# Patient Record
Sex: FEMALE | Race: BLACK OR AFRICAN AMERICAN | NOT HISPANIC OR LATINO | Employment: FULL TIME | URBAN - METROPOLITAN AREA
[De-identification: names, ages, dates, MRNs, and addresses within clinical notes are randomized per-mention and may not be internally consistent; named-entity substitution may affect disease eponyms.]

---

## 2018-11-27 ENCOUNTER — OFFICE VISIT (OUTPATIENT)
Dept: URGENT CARE | Facility: CLINIC | Age: 28
End: 2018-11-27
Payer: COMMERCIAL

## 2018-11-27 VITALS
HEIGHT: 59 IN | OXYGEN SATURATION: 99 % | RESPIRATION RATE: 18 BRPM | WEIGHT: 105 LBS | HEART RATE: 70 BPM | BODY MASS INDEX: 21.17 KG/M2 | SYSTOLIC BLOOD PRESSURE: 116 MMHG | TEMPERATURE: 99 F | DIASTOLIC BLOOD PRESSURE: 75 MMHG

## 2018-11-27 DIAGNOSIS — R11.2 NAUSEA AND VOMITING, INTRACTABILITY OF VOMITING NOT SPECIFIED, UNSPECIFIED VOMITING TYPE: ICD-10-CM

## 2018-11-27 DIAGNOSIS — B34.9 ACUTE VIRAL SYNDROME: Primary | ICD-10-CM

## 2018-11-27 DIAGNOSIS — R35.0 URINARY FREQUENCY: ICD-10-CM

## 2018-11-27 DIAGNOSIS — J00 NASOPHARYNGITIS: ICD-10-CM

## 2018-11-27 DIAGNOSIS — R05.9 COUGH: ICD-10-CM

## 2018-11-27 LAB
B-HCG UR QL: NEGATIVE
BILIRUB UR QL STRIP: NEGATIVE
CTP QC/QA: YES
GLUCOSE UR QL STRIP: NEGATIVE
KETONES UR QL STRIP: NEGATIVE
LEUKOCYTE ESTERASE UR QL STRIP: NEGATIVE
PH, POC UA: 5.5
POC BLOOD, URINE: POSITIVE
POC NITRATES, URINE: NEGATIVE
PROT UR QL STRIP: POSITIVE
SP GR UR STRIP: 1.02 (ref 1–1.03)
UROBILINOGEN UR STRIP-ACNC: NORMAL (ref 0.1–1.1)

## 2018-11-27 PROCEDURE — 99203 OFFICE O/P NEW LOW 30 MIN: CPT | Mod: 25,S$GLB,, | Performed by: NURSE PRACTITIONER

## 2018-11-27 PROCEDURE — 81025 URINE PREGNANCY TEST: CPT | Mod: S$GLB,,, | Performed by: NURSE PRACTITIONER

## 2018-11-27 PROCEDURE — 81003 URINALYSIS AUTO W/O SCOPE: CPT | Mod: QW,S$GLB,, | Performed by: NURSE PRACTITIONER

## 2018-11-27 RX ORDER — PROMETHAZINE HYDROCHLORIDE AND DEXTROMETHORPHAN HYDROBROMIDE 6.25; 15 MG/5ML; MG/5ML
5 SYRUP ORAL NIGHTLY PRN
Qty: 120 ML | Refills: 0 | Status: SHIPPED | OUTPATIENT
Start: 2018-11-27 | End: 2018-12-07

## 2018-11-27 RX ORDER — ONDANSETRON 4 MG/1
4 TABLET, FILM COATED ORAL EVERY 8 HOURS PRN
Qty: 10 TABLET | Refills: 0 | Status: SHIPPED | OUTPATIENT
Start: 2018-11-27 | End: 2019-05-14

## 2018-11-27 NOTE — PATIENT INSTRUCTIONS
"Maintain hydration by drinking small amounts of clear fluids frequently, then soft diet, and then advance diet as tolerated. May use OTC Imodium or Pepto if desired for any diarrhea.    If your condition worsens or fails to improve we recommend that you receive another evaluation at the ER immediately or contact your PCP to discuss your concerns or return here. You must understand that you've received an urgent care treatment only and that you may be released before all your medical problems are known or treated. You the patient will arrange for follouwp care as instructed.   If we discussed that I think your illness is viral, it will not respond to antibiotics and will last 10-14 days.   Flonase (fluticasone) is a nasal spray which is available over the counter and may help with your symptoms.   Zyrtec D, Claritin D or Allegra D can also help with symptoms of congestion and drainage.   If you have hypertension avoid using the "D" which is the decongestant   If you just have drainage you can take plain zyrtec, claritin or allegra   Nasal irrigation with a saline spray or Netti Pot like device per their directions is also recommended.  If you just have a congested feeling you can take pseudoephedrine (unless you have high blood pressure) which you have to sign for behind the counter. Do not buy the phenylephrine which is on the shelf as it is not effective   Rest and fluids are also important.   Tylenol or ibuprofen can also be used as directed for pain unless you have an allergy to them or medical condition such as stomach ulcers, kidney or liver disease or blood thinners etc for which you should not be taking these type of medications.         Viral Syndrome (Adult)  A viral illness may cause a number of symptoms. The symptoms depend on the part of the body that the virus affects. If it settles in your nose, throat, and lungs, it may cause cough, sore throat, congestion, and sometimes headache. If it settles in " "your stomach and intestinal tract, it may cause vomiting and diarrhea. Sometimes it causes vague symptoms like "aching all over," feeling tired, loss of appetite, or fever.  A viral illness usually lasts 1 to 2 weeks, but sometimes it lasts longer. In some cases, a more serious infection can look like a viral syndrome in the first few days of the illness. You may need another exam and additional tests to know the difference. Watch for the warning signs listed below.  Home care  Follow these guidelines for taking care of yourself at home:  · If symptoms are severe, rest at home for the first 2 to 3 days.  · Stay away from cigarette smoke - both your smoke and the smoke from others.  · You may use over-the-counter acetaminophen or ibuprofen for fever, muscle aching, and headache, unless another medicine was prescribed for this. If you have chronic liver or kidney disease or ever had a stomach ulcer or GI bleeding, talk with your doctor before using these medicines. No one who is younger than 18 and ill with a fever should take aspirin. It may cause severe disease or death.  · Your appetite may be poor, so a light diet is fine. Avoid dehydration by drinking 8 to 12 8-ounce glasses of fluids each day. This may include water; orange juice; lemonade; apple, grape, and cranberry juice; clear fruit drinks; electrolyte replacement and sports drinks; and decaffeinated teas and coffee. If you have been diagnosed with a kidney disease, ask your doctor how much and what types of fluids you should drink to prevent dehydration. If you have kidney disease, drinking too much fluid can cause it build up in the your body and be dangerous to your health.  · Over-the-counter remedies won't shorten the length of the illness but may be helpful for cough, sore throat; and nasal and sinus congestion. Don't use decongestants if you have high blood pressure.  Follow-up care  Follow up with your healthcare provider if you do not improve over " the next week.  Call 911  Get emergency medical care if any of the following occur:  · Convulsion  · Feeling weak, dizzy, or like you are going to faint  · Chest pain, shortness of breath, wheezing, or difficulty breathing  When to seek medical advice  Call your healthcare provider right away if any of these occur:  · Cough with lots of colored sputum (mucus) or blood in your sputum  · Chest pain, shortness of breath, wheezing, or difficulty breathing  · Severe headache; face, neck, or ear pain  · Severe, constant pain in the lower right side of your belly (abdominal)  · Continued vomiting (cant keep liquids down)  · Frequent diarrhea (more than 5 times a day); blood (red or black color) or mucus in diarrhea  · Feeling weak, dizzy, or like you are going to faint  · Extreme thirst  · Fever of 100.4°F (38°C) or higher, or as directed by your healthcare provider  Date Last Reviewed: 9/25/2015  © 0137-9651 [employer]. [address] [place] [address], [place] [address]. All rights reserved. This information is not intended as a substitute for professional medical care. Always follow your healthcare professional's instructions.

## 2018-11-27 NOTE — LETTER
November 27, 2018      [facility] [address] [place]  [facility] [address]  Phone: [phone]  Fax: [phone]       Patient: Nancie Ash   YOB: 1990  Date of Visit: 11/27/2018    To Whom It May Concern:    Marisela Ash  was at [facility] on 11/27/2018. She may return to work/school on 11/29/18 with no restrictions if fever free for 24 hours. If you have any questions or concerns, or if I can be of further assistance, please do not hesitate to contact me.    Sincerely,        Bella Simpson, NP

## 2018-11-27 NOTE — PROGRESS NOTES
"Subjective:       Patient ID: Nancie Ash is a 28 y.o. female.    Vitals:    11/27/18 1141   BP: 116/75   Pulse: 70   Resp: 18   Temp: 98.8 °F (37.1 °C)   SpO2: 99%   Weight: 47.6 kg (105 lb)   Height: 4' 11" (1.499 m)       Chief Complaint: Emesis (started on sunday ); Urinary Frequency; Sore Throat; and Back Pain (upper back' RT SIDE )    Patient presents in clinic with complaint of nausea and vomiting that started on Sunday with sore throat nasal congestion and cough.  She does report body aches especially in her back with some urinary frequency.  She denies dysuria or vaginal discharge. She denies urgency to urinate.  No flank pain. Denies hematuria but she is current on her menstrual cycle.  She has not had any episodes of emesis today.  Denies abdominal pain. She reports a fever of 102 F, last fever was yesterday.  Last emesis was yesterday.  Still with some nausea today and decreased appetite today.  When asked which symptom is bothering her the worst, she says her throat.  Patient states that her daughter had the same symptoms last week.  Her daughter was tested for the flu and strep all were negative and was told viral.  She is using [place] cold over-the-counter with no relief of symptoms.      Cough   This is a new problem. The problem has been gradually worsening. The cough is productive of sputum. Associated symptoms include a fever, headaches, nasal congestion, postnasal drip and a sore throat. Pertinent negatives include no chest pain, chills, ear congestion, ear pain, eye redness, heartburn, hemoptysis, myalgias, rash, rhinorrhea, shortness of breath, sweats, weight loss or wheezing. Nothing aggravates the symptoms. She has tried OTC cough suppressant for the symptoms. The treatment provided no relief. There is no history of asthma, bronchitis or pneumonia.     Review of Systems   Constitution: Positive for decreased appetite and fever. Negative for chills, malaise/fatigue and weight " loss.   HENT: Positive for hoarse voice, postnasal drip and sore throat. Negative for ear pain, odynophagia, rhinorrhea and stridor.    Eyes: Negative for discharge and redness.   Cardiovascular: Negative for chest pain, dyspnea on exertion and leg swelling.   Respiratory: Positive for cough and sputum production (green/yellow ). Negative for hemoptysis, shortness of breath and wheezing.    Skin: Negative for rash.   Musculoskeletal: Positive for back pain (upper). Negative for myalgias.   Gastrointestinal: Positive for nausea and vomiting. Negative for abdominal pain and heartburn.   Genitourinary: Positive for frequency. Negative for dysuria, flank pain, hematuria, pelvic pain and urgency.   Neurological: Positive for headaches.       Objective:      Physical Exam   Constitutional: She is oriented to person, place, and time. She appears well-developed and well-nourished. She is cooperative.  Non-toxic appearance. She does not have a sickly appearance. She does not appear ill. No distress.   HENT:   Head: Normocephalic and atraumatic.   Right Ear: Hearing, tympanic membrane, external ear and ear canal normal.   Left Ear: Hearing, tympanic membrane, external ear and ear canal normal.   Nose: Mucosal edema and rhinorrhea present. No nasal deformity. No epistaxis. Right sinus exhibits no maxillary sinus tenderness and no frontal sinus tenderness. Left sinus exhibits no maxillary sinus tenderness and no frontal sinus tenderness.   Mouth/Throat: Uvula is midline and mucous membranes are normal. No trismus in the jaw. Normal dentition. No uvula swelling. Posterior oropharyngeal erythema (mild, hoarseness) present. No oropharyngeal exudate, posterior oropharyngeal edema or tonsillar abscesses.   Eyes: Conjunctivae and lids are normal. No scleral icterus.   Sclera clear bilat   Neck: Trachea normal, full passive range of motion without pain and phonation normal. Neck supple.   Cardiovascular: Normal rate, regular rhythm,  normal heart sounds, intact distal pulses and normal pulses.   Pulmonary/Chest: Effort normal and breath sounds normal. No respiratory distress.   Abdominal: Soft. Normal appearance and bowel sounds are normal. She exhibits no distension. There is no tenderness. There is no rigidity, no rebound, no guarding and no CVA tenderness.   Musculoskeletal: Normal range of motion. She exhibits no edema or deformity.   Lymphadenopathy:     She has no cervical adenopathy.   Neurological: She is alert and oriented to person, place, and time. She exhibits normal muscle tone. Coordination normal.   Skin: Skin is warm, dry and intact. She is not diaphoretic. No pallor.   Psychiatric: She has a normal mood and affect. Her speech is normal and behavior is normal. Judgment and thought content normal. Cognition and memory are normal.   Nursing note and vitals reviewed.      Results for orders placed or performed in visit on 11/27/18   POCT urine pregnancy   Result Value Ref Range    POC Preg Test, Ur Negative Negative     Acceptable Yes    POCT Urinalysis, Dipstick, Automated, W/O Scope   Result Value Ref Range    POC Blood, Urine Positive (A) Negative    POC Bilirubin, Urine Negative Negative    POC Urobilinogen, Urine normal 0.1 - 1.1    POC Ketones, Urine Negative Negative    POC Protein, Urine Positive (A) Negative    POC Nitrates, Urine Negative Negative    POC Glucose, Urine Negative Negative    pH, UA 5.5     POC Specific Gravity, Urine 1.025 1.003 - 1.029    POC Leukocytes, Urine Negative Negative     Assessment:       1. Acute viral syndrome    2. Urinary frequency    3. Nausea and vomiting, intractability of vomiting not specified, unspecified vomiting type    4. Nasopharyngitis    5. Cough        Plan:       Nancie was seen today for emesis, urinary frequency, sore throat and back pain.    Diagnoses and all orders for this visit:    Acute viral syndrome    Urinary frequency  -     POCT urine pregnancy  -      "POCT Urinalysis, Dipstick, Automated, W/O Scope    Nausea and vomiting, intractability of vomiting not specified, unspecified vomiting type  -     ondansetron (ZOFRAN) 4 MG tablet; Take 1 tablet (4 mg total) by mouth every 8 (eight) hours as needed for Nausea.    Nasopharyngitis    Cough  -     promethazine-dextromethorphan (PROMETHAZINE-DM) 6.25-15 mg/5 mL Syrp; Take 5 mLs by mouth nightly as needed (cough).      Patient Instructions   Maintain hydration by drinking small amounts of clear fluids frequently, then soft diet, and then advance diet as tolerated. May use OTC Imodium or Pepto if desired for any diarrhea.    If your condition worsens or fails to improve we recommend that you receive another evaluation at the ER immediately or contact your PCP to discuss your concerns or return here. You must understand that you've received an urgent care treatment only and that you may be released before all your medical problems are known or treated. You the patient will arrange for [facility] care as instructed.   If we discussed that I think your illness is viral, it will not respond to antibiotics and will last 10-14 days.   Flonase (fluticasone) is a nasal spray which is available over the counter and may help with your symptoms.   Zyrtec D, Claritin D or Allegra D can also help with symptoms of congestion and drainage.   If you have hypertension avoid using the "D" which is the decongestant   If you just have drainage you can take plain zyrtec, claritin or allegra   Nasal irrigation with a saline spray or Netti Pot like device per their directions is also recommended.  If you just have a congested feeling you can take pseudoephedrine (unless you have high blood pressure) which you have to sign for behind the counter. Do not buy the phenylephrine which is on the shelf as it is not effective   Rest and fluids are also important.   Tylenol or ibuprofen can also be used as directed for pain unless you have an allergy to " "them or medical condition such as stomach ulcers, kidney or liver disease or blood thinners etc for which you should not be taking these type of medications.         Viral Syndrome (Adult)  A viral illness may cause a number of symptoms. The symptoms depend on the part of the body that the virus affects. If it settles in your nose, throat, and lungs, it may cause cough, sore throat, congestion, and sometimes headache. If it settles in your stomach and intestinal tract, it may cause vomiting and diarrhea. Sometimes it causes vague symptoms like "aching all over," feeling tired, loss of appetite, or fever.  A viral illness usually lasts 1 to 2 weeks, but sometimes it lasts longer. In some cases, a more serious infection can look like a viral syndrome in the first few days of the illness. You may need another exam and additional tests to know the difference. Watch for the warning signs listed below.  Home care  Follow these guidelines for taking care of yourself at home:  · If symptoms are severe, rest at home for the first 2 to 3 days.  · Stay away from cigarette smoke - both your smoke and the smoke from others.  · You may use over-the-counter acetaminophen or ibuprofen for fever, muscle aching, and headache, unless another medicine was prescribed for this. If you have chronic liver or kidney disease or ever had a stomach ulcer or GI bleeding, talk with your doctor before using these medicines. No one who is younger than 18 and ill with a fever should take aspirin. It may cause severe disease or death.  · Your appetite may be poor, so a light diet is fine. Avoid dehydration by drinking 8 to 12 8-ounce glasses of fluids each day. This may include water; orange juice; lemonade; apple, grape, and cranberry juice; clear fruit drinks; electrolyte replacement and sports drinks; and decaffeinated teas and coffee. If you have been diagnosed with a kidney disease, ask your doctor how much and what types of fluids you should " drink to prevent dehydration. If you have kidney disease, drinking too much fluid can cause it build up in the your body and be dangerous to your health.  · Over-the-counter remedies won't shorten the length of the illness but may be helpful for cough, sore throat; and nasal and sinus congestion. Don't use decongestants if you have high blood pressure.  Follow-up care  Follow up with your healthcare provider if you do not improve over the next week.  Call 911  Get emergency medical care if any of the following occur:  · Convulsion  · Feeling weak, dizzy, or like you are going to faint  · Chest pain, shortness of breath, wheezing, or difficulty breathing  When to seek medical advice  Call your healthcare provider right away if any of these occur:  · Cough with lots of colored sputum (mucus) or blood in your sputum  · Chest pain, shortness of breath, wheezing, or difficulty breathing  · Severe headache; face, neck, or ear pain  · Severe, constant pain in the lower right side of your belly (abdominal)  · Continued vomiting (cant keep liquids down)  · Frequent diarrhea (more than 5 times a day); blood (red or black color) or mucus in diarrhea  · Feeling weak, dizzy, or like you are going to faint  · Extreme thirst  · Fever of 100.4°F (38°C) or higher, or as directed by your healthcare provider  Date Last Reviewed: 9/25/2015  © 3718-2430 [employer]. [address] [place] [address]. All rights reserved. This information is not intended as a substitute for professional medical care. Always follow your healthcare professional's instructions.

## 2019-05-06 ENCOUNTER — OFFICE VISIT (OUTPATIENT)
Dept: URGENT CARE | Facility: CLINIC | Age: 29
End: 2019-05-06
Payer: COMMERCIAL

## 2019-05-06 VITALS
HEIGHT: 59 IN | TEMPERATURE: 99 F | SYSTOLIC BLOOD PRESSURE: 108 MMHG | RESPIRATION RATE: 16 BRPM | DIASTOLIC BLOOD PRESSURE: 70 MMHG | BODY MASS INDEX: 22.18 KG/M2 | HEART RATE: 93 BPM | WEIGHT: 110 LBS | OXYGEN SATURATION: 100 %

## 2019-05-06 DIAGNOSIS — J02.9 SORE THROAT: Primary | ICD-10-CM

## 2019-05-06 LAB
CTP QC/QA: YES
S PYO RRNA THROAT QL PROBE: NEGATIVE

## 2019-05-06 PROCEDURE — 99214 OFFICE O/P EST MOD 30 MIN: CPT | Mod: 25,S$GLB,, | Performed by: EMERGENCY MEDICINE

## 2019-05-06 PROCEDURE — 96372 PR INJECTION,THERAP/PROPH/DIAG2ST, IM OR SUBCUT: ICD-10-PCS | Mod: S$GLB,,, | Performed by: EMERGENCY MEDICINE

## 2019-05-06 PROCEDURE — 87880 STREP A ASSAY W/OPTIC: CPT | Mod: QW,S$GLB,, | Performed by: EMERGENCY MEDICINE

## 2019-05-06 PROCEDURE — 87880 POCT RAPID STREP A: ICD-10-PCS | Mod: QW,S$GLB,, | Performed by: EMERGENCY MEDICINE

## 2019-05-06 PROCEDURE — 99214 PR OFFICE/OUTPT VISIT, EST, LEVL IV, 30-39 MIN: ICD-10-PCS | Mod: 25,S$GLB,, | Performed by: EMERGENCY MEDICINE

## 2019-05-06 PROCEDURE — 3008F BODY MASS INDEX DOCD: CPT | Mod: CPTII,S$GLB,, | Performed by: EMERGENCY MEDICINE

## 2019-05-06 PROCEDURE — 96372 THER/PROPH/DIAG INJ SC/IM: CPT | Mod: S$GLB,,, | Performed by: EMERGENCY MEDICINE

## 2019-05-06 PROCEDURE — 3008F PR BODY MASS INDEX (BMI) DOCUMENTED: ICD-10-PCS | Mod: CPTII,S$GLB,, | Performed by: EMERGENCY MEDICINE

## 2019-05-06 RX ORDER — BETAMETHASONE SODIUM PHOSPHATE AND BETAMETHASONE ACETATE 3; 3 MG/ML; MG/ML
9 INJECTION, SUSPENSION INTRA-ARTICULAR; INTRALESIONAL; INTRAMUSCULAR; SOFT TISSUE
Status: COMPLETED | OUTPATIENT
Start: 2019-05-06 | End: 2019-05-06

## 2019-05-06 RX ADMIN — BETAMETHASONE SODIUM PHOSPHATE AND BETAMETHASONE ACETATE 9 MG: 3; 3 INJECTION, SUSPENSION INTRA-ARTICULAR; INTRALESIONAL; INTRAMUSCULAR; SOFT TISSUE at 04:05

## 2019-05-06 NOTE — PATIENT INSTRUCTIONS
Please return here or go to the Emergency Department for any concerns or worsening of condition.    Zyrtec, Claritin, or Allegra OTC as directed for the next 7 days    Flonase OTC as directed for the next 7 days    [place] Nasal Spray OTC 3x/day for the next 7 days    Tylenol 500mg 2 tabs by mouth every 8 hours  Motrin 200mg 2 tabs by mouth every 8 hours  Alternate Tylenol and Motrin every 4hours    Mucinex or Robitussin OTC as directed for cough    Sudafed as directed for runny nose and congestion      Follow up with Primary Care or ENT if not improved in 7-10 Days:  [phone]      Viral Pharyngitis (Sore Throat)     You (or your child, if your child is the patient) have pharyngitis (sore throat). This infection is caused by a virus. It can cause throat pain that is worse when swallowing, aching all over, headache, and fever. The infection may be spread by coughing, kissing, or touching others after touching your mouth or nose. Antibiotic medications do not work against viruses, so they are not used for treating this condition.  Home care  · If your symptoms are severe, rest at home. Return to work or school when you feel well enough.   · Drink plenty of fluids to avoid dehydration.  · For children: Use acetaminophen for fever, fussiness or discomfort. In infants over six months of age, you may use ibuprofen instead of acetaminophen. (NOTE: If your child has chronic liver or kidney disease or ever had a stomach ulcer or GI bleeding, talk with your doctor before using these medicines.) (NOTE: Aspirin should never be used in anyone under 18 years of age who is ill with a fever. It may cause severe liver damage.)   · For adults: You may use acetaminophen or ibuprofen to control pain or fever, unless another medicine was prescribed for this. (NOTE: If you have chronic liver or kidney disease or ever had a stomach ulcer or GI bleeding, talk with your doctor before using these medicines.)  · Throat lozenges or numbing  throat sprays can help reduce pain. Gargling with warm salt water will also help reduce throat pain. For this, dissolve 1/2 teaspoon of salt in 1 glass of warm water. To help soothe a sore throat, children can sip on juice or a popsicle. Children 5 years and older can also suck on a lollipop or hard candy.  · Avoid salty or spicy foods, which can be irritating to the throat.  Follow-up care  Follow up with your healthcare provider or our staff if you are not improving over the next week.  When to seek medical advice  Call your healthcare provider right away if any of these occur:  · Fever as directed by your doctor.  For children, seek care if:  ? Your child is of any age and has repeated fevers above 104°F (40°C).  ? Your child is younger than 2 years of age and has a fever of 100.4°F (38°C) that continues for more than 1 day.  ? Your child is 2 years old or older and has a fever of 100.4°F (38°C) that continues for more than 3 days.  · New or worsening ear pain, sinus pain, or headache  · Painful lumps in the back of neck  · Stiff neck  · Lymph nodes are getting larger  · Inability to swallow liquids, excessive drooling, or inability to open mouth wide due to throat pain  · Signs of dehydration (very dark urine or no urine, sunken eyes, dizziness)  · Trouble breathing or noisy breathing  · Muffled voice  · New rash  · Child appears to be getting sicker  Date Last Reviewed: 4/13/2015  © 1683-1812 [employer]. [address] [place] [address], [place] [address]. All rights reserved. This information is not intended as a substitute for professional medical care. Always follow your healthcare professional's instructions.

## 2019-05-06 NOTE — PROGRESS NOTES
"Subjective:       Patient ID: Nancie Ash is a 28 y.o. female.    Vitals:    05/06/19 1602   BP: 108/70   Pulse: 93   Resp: 16   Temp: 98.8 °F (37.1 °C)   TempSrc: Oral   SpO2: 100%   Weight: 49.9 kg (110 lb)   Height: 4' 11" (1.499 m)       Chief Complaint: Sore Throat    Patient reports sore throat and hoariness since yesterday.     Sore Throat    This is a new problem. The current episode started yesterday. The problem has been gradually worsening. Neither side of throat is experiencing more pain than the other. There has been no fever. The pain is at a severity of 10/10. Associated symptoms include coughing, headaches, a hoarse voice, a plugged ear sensation and trouble swallowing. Pertinent negatives include no abdominal pain, congestion, ear pain or shortness of breath. She has had no exposure to strep or mono. Treatments tried: Thera Flu. The treatment provided no relief.     Review of Systems   Constitution: Negative for chills, fever and malaise/fatigue.   HENT: Positive for hoarse voice, sore throat and trouble swallowing. Negative for congestion and ear pain.    Eyes: Negative for discharge and redness.   Cardiovascular: Negative for chest pain, dyspnea on exertion and leg swelling.   Respiratory: Positive for cough and sputum production (yellow/green). Negative for shortness of breath and wheezing.    Musculoskeletal: Negative for myalgias.   Gastrointestinal: Negative for abdominal pain and nausea.   Neurological: Positive for headaches.       Objective:      Physical Exam   Constitutional: She is oriented to person, place, and time. She appears well-developed and well-nourished. She is cooperative.  Non-toxic appearance. She does not appear ill. No distress.   HENT:   Head: Normocephalic and atraumatic.   Right Ear: Hearing, tympanic membrane, external ear and ear canal normal.   Left Ear: Hearing, tympanic membrane, external ear and ear canal normal.   Nose: Rhinorrhea present. No mucosal " edema or nasal deformity. No epistaxis. Right sinus exhibits no maxillary sinus tenderness and no frontal sinus tenderness. Left sinus exhibits no maxillary sinus tenderness and no frontal sinus tenderness.   Mouth/Throat: Uvula is midline and mucous membranes are normal. No trismus in the jaw. Normal dentition. No uvula swelling. Posterior oropharyngeal erythema present.   Eyes: Conjunctivae and lids are normal. No scleral icterus.   Sclera clear bilat   Neck: Trachea normal, full passive range of motion without pain and phonation normal. Neck supple.   Cardiovascular: Normal rate, regular rhythm, normal heart sounds, intact distal pulses and normal pulses.   Pulmonary/Chest: Effort normal and breath sounds normal. No respiratory distress.   Occasional cough on exam   Abdominal: Soft. Normal appearance and bowel sounds are normal. She exhibits no distension. There is no tenderness.   Musculoskeletal: Normal range of motion. She exhibits no edema or deformity.   Neurological: She is alert and oriented to person, place, and time. She exhibits normal muscle tone. Coordination normal.   Skin: Skin is warm, dry and intact. She is not diaphoretic. No pallor.   Psychiatric: She has a normal mood and affect. Her speech is normal and behavior is normal. Judgment and thought content normal. Cognition and memory are normal.   Nursing note and vitals reviewed.      Assessment:       1. Sore throat        Plan:       Nancie was seen today for sore throat.    Diagnoses and all orders for this visit:    Sore throat  -     POCT rapid strep A    Other orders  -     betamethasone acetate-betamethasone sodium phosphate injection 9 mg          Patient Instructions   Please return here or go to the Emergency Department for any concerns or worsening of condition.    Zyrtec, Claritin, or Allegra OTC as directed for the next 7 days    Flonase OTC as directed for the next 7 days    Salt Water Nasal Spray OTC 3x/day for the next 7  days    Tylenol 500mg 2 tabs by mouth every 8 hours  Motrin 200mg 2 tabs by mouth every 8 hours  Alternate Tylenol and Motrin every 4hours    Mucinex or Robitussin OTC as directed for cough    Sudafed as directed for runny nose and congestion      Follow up with Primary Care or ENT if not improved in 7-10 Days:  [phone]      Viral Pharyngitis (Sore Throat)     You (or your child, if your child is the patient) have pharyngitis (sore throat). This infection is caused by a virus. It can cause throat pain that is worse when swallowing, aching all over, headache, and fever. The infection may be spread by coughing, kissing, or touching others after touching your mouth or nose. Antibiotic medications do not work against viruses, so they are not used for treating this condition.  Home care  · If your symptoms are severe, rest at home. Return to work or school when you feel well enough.   · Drink plenty of fluids to avoid dehydration.  · For children: Use acetaminophen for fever, fussiness or discomfort. In infants over six months of age, you may use ibuprofen instead of acetaminophen. (NOTE: If your child has chronic liver or kidney disease or ever had a stomach ulcer or GI bleeding, talk with your doctor before using these medicines.) (NOTE: Aspirin should never be used in anyone under 18 years of age who is ill with a fever. It may cause severe liver damage.)   · For adults: You may use acetaminophen or ibuprofen to control pain or fever, unless another medicine was prescribed for this. (NOTE: If you have chronic liver or kidney disease or ever had a stomach ulcer or GI bleeding, talk with your doctor before using these medicines.)  · Throat lozenges or numbing throat sprays can help reduce pain. Gargling with warm salt water will also help reduce throat pain. For this, dissolve 1/2 teaspoon of salt in 1 glass of warm water. To help soothe a sore throat, children can sip on juice or a popsicle. Children 5 years and older  can also suck on a lollipop or hard candy.  · Avoid salty or spicy foods, which can be irritating to the throat.  Follow-up care  Follow up with your healthcare provider or our staff if you are not improving over the next week.  When to seek medical advice  Call your healthcare provider right away if any of these occur:  · Fever as directed by your doctor.  For children, seek care if:  ? Your child is of any age and has repeated fevers above 104°F (40°C).  ? Your child is younger than 2 years of age and has a fever of 100.4°F (38°C) that continues for more than 1 day.  ? Your child is 2 years old or older and has a fever of 100.4°F (38°C) that continues for more than 3 days.  · New or worsening ear pain, sinus pain, or headache  · Painful lumps in the back of neck  · Stiff neck  · Lymph nodes are getting larger  · Inability to swallow liquids, excessive drooling, or inability to open mouth wide due to throat pain  · Signs of dehydration (very dark urine or no urine, sunken eyes, dizziness)  · Trouble breathing or noisy breathing  · Muffled voice  · New rash  · Child appears to be getting sicker  Date Last Reviewed: 4/13/2015  © 7895-0441 [employer]. [address] [place] [address], [place] [address]. All rights reserved. This information is not intended as a substitute for professional medical care. Always follow your healthcare professional's instructions.

## 2019-05-06 NOTE — LETTER
May 6, 2019      [facility] [address]  [facility] [address]  Phone: [phone]  Fax: [phone]       Patient: Nancie Ash   YOB: 1990  Date of Visit: 05/06/2019    To Whom It May Concern:    Marisela Ash  was at [facility] on 05/06/2019. She may return to work/school on 5/8/19 with no restrictions. If you have any questions or concerns, or if I can be of further assistance, please do not hesitate to contact me.    Sincerely,    Oseas Doyle III, MD

## 2019-05-14 ENCOUNTER — OFFICE VISIT (OUTPATIENT)
Dept: OBSTETRICS AND GYNECOLOGY | Facility: CLINIC | Age: 29
End: 2019-05-14
Payer: COMMERCIAL

## 2019-05-14 ENCOUNTER — LAB VISIT (OUTPATIENT)
Dept: LAB | Facility: OTHER | Age: 29
End: 2019-05-14
Payer: COMMERCIAL

## 2019-05-14 VITALS
SYSTOLIC BLOOD PRESSURE: 100 MMHG | HEIGHT: 59 IN | BODY MASS INDEX: 20.45 KG/M2 | DIASTOLIC BLOOD PRESSURE: 70 MMHG | WEIGHT: 101.44 LBS

## 2019-05-14 DIAGNOSIS — Z11.3 SCREEN FOR STD (SEXUALLY TRANSMITTED DISEASE): ICD-10-CM

## 2019-05-14 DIAGNOSIS — Z01.419 WELL WOMAN EXAM WITH ROUTINE GYNECOLOGICAL EXAM: Primary | ICD-10-CM

## 2019-05-14 DIAGNOSIS — N89.8 VAGINAL ODOR: ICD-10-CM

## 2019-05-14 LAB
BACTERIAL VAGINOSIS DNA: POSITIVE
C TRACH DNA SPEC QL NAA+PROBE: NOT DETECTED
CANDIDA GLABRATA DNA: NEGATIVE
CANDIDA KRUSEI DNA: NEGATIVE
CANDIDA RRNA VAG QL PROBE: NEGATIVE
N GONORRHOEA DNA SPEC QL NAA+PROBE: NOT DETECTED
T VAGINALIS RRNA GENITAL QL PROBE: NEGATIVE

## 2019-05-14 PROCEDURE — 87624 HPV HI-RISK TYP POOLED RSLT: CPT

## 2019-05-14 PROCEDURE — 87491 CHLMYD TRACH DNA AMP PROBE: CPT

## 2019-05-14 PROCEDURE — 87480 CANDIDA DNA DIR PROBE: CPT

## 2019-05-14 PROCEDURE — 99385 PR PREVENTIVE VISIT,NEW,18-39: ICD-10-PCS | Mod: S$GLB,,, | Performed by: OBSTETRICS & GYNECOLOGY

## 2019-05-14 PROCEDURE — 99385 PREV VISIT NEW AGE 18-39: CPT | Mod: S$GLB,,, | Performed by: OBSTETRICS & GYNECOLOGY

## 2019-05-14 PROCEDURE — 86592 SYPHILIS TEST NON-TREP QUAL: CPT

## 2019-05-14 PROCEDURE — 36415 COLL VENOUS BLD VENIPUNCTURE: CPT

## 2019-05-14 PROCEDURE — 87510 GARDNER VAG DNA DIR PROBE: CPT

## 2019-05-14 PROCEDURE — 99999 PR PBB SHADOW E&M-EST. PATIENT-LVL II: ICD-10-PCS | Mod: PBBFAC,,, | Performed by: OBSTETRICS & GYNECOLOGY

## 2019-05-14 PROCEDURE — 88175 CYTOPATH C/V AUTO FLUID REDO: CPT

## 2019-05-14 PROCEDURE — 86703 HIV-1/HIV-2 1 RESULT ANTBDY: CPT

## 2019-05-14 PROCEDURE — 99999 PR PBB SHADOW E&M-EST. PATIENT-LVL II: CPT | Mod: PBBFAC,,, | Performed by: OBSTETRICS & GYNECOLOGY

## 2019-05-14 PROCEDURE — 80074 ACUTE HEPATITIS PANEL: CPT

## 2019-05-14 NOTE — PROGRESS NOTES
History & Physical  Gynecology      SUBJECTIVE:     Chief Complaint: Well Woman       History of Present Illness:  Annual Exam-Premenopausal  Patient presents for annual exam. The patient has complaints today of a musty/onion odor.  No discharge or itching. Menstrual cycles are occasional- nexplanon.  The patient is not currently sexually active. GYN screening history: last pap: approximate date  and was normal. The patient participates in regular exercise: no.  The patient does not smoke.      Contraception: nexplanon    FH:  Breast cancer: neg  Colon cancer: neg  Ovarian cancer: neg    Review of patient's allergies indicates:  No Known Allergies    History reviewed. No pertinent past medical history.  History reviewed. No pertinent surgical history.  OB History        1    Para   1    Term   1            AB        Living           SAB        TAB        Ectopic        Multiple        Live Births                   Family History   Problem Relation Age of Onset    No Known Problems Mother     Cancer Father     Breast cancer Neg Hx     Colon cancer Neg Hx     Ovarian cancer Neg Hx      Social History     Tobacco Use    Smoking status: Never Smoker    Smokeless tobacco: Never Used   Substance Use Topics    Alcohol use: Yes     Comment: occ.    Drug use: Never       Current Outpatient Medications   Medication Sig    etonogestrel (NEXPLANON) 68 mg Impl subdermal device 68 mg by Subdermal route.     No current facility-administered medications for this visit.          Review of Systems:  Review of Systems   Constitutional: Negative for activity change, appetite change and fever.   Respiratory: Negative for shortness of breath.    Cardiovascular: Negative for chest pain.   Gastrointestinal: Negative for abdominal pain, constipation, diarrhea, nausea and vomiting.   Genitourinary: Positive for vaginal odor. Negative for menorrhagia, menstrual problem, pelvic pain, vaginal bleeding, vaginal  discharge and vaginal pain.   Integumentary:  Negative for nipple discharge.   Neurological: Negative for numbness and headaches.   Breast: Negative for mastodynia and nipple discharge       OBJECTIVE:     Physical Exam:  Physical Exam   Constitutional: She is oriented to person, place, and time. She appears well-developed and well-nourished.   Neck: Normal range of motion. Neck supple. No tracheal deviation present. No thyromegaly present.   Cardiovascular: Normal rate, regular rhythm and normal heart sounds.   Pulmonary/Chest: Effort normal and breath sounds normal. Right breast exhibits no inverted nipple, no mass, no nipple discharge, no skin change and no tenderness. Left breast exhibits no inverted nipple, no mass, no nipple discharge, no skin change and no tenderness. Breasts are symmetrical.   Abdominal: Soft.   Genitourinary: Vagina normal and uterus normal. No labial fusion. There is no rash, tenderness, lesion or injury on the right labia. There is no rash, tenderness, lesion or injury on the left labia. Uterus is not deviated, not enlarged, not fixed and not tender. Cervix exhibits no motion tenderness, no discharge and no friability. Right adnexum displays no mass, no tenderness and no fullness. Left adnexum displays no mass, no tenderness and no fullness. No erythema, tenderness or bleeding in the vagina. No foreign body in the vagina. No signs of injury around the vagina. No vaginal discharge found.   Genitourinary Comments: Urethra: normal appearing urethra with no masses, tenderness or lesions  Urethral meatus: normal size, anterior vaginal wall with no prolapse, no lesions   Neurological: She is alert and oriented to person, place, and time.   Psychiatric: She has a normal mood and affect. Her behavior is normal. Judgment and thought content normal.   Nursing note and vitals reviewed.      Chaperoned by: Sasha    ASSESSMENT:       ICD-10-CM ICD-9-CM    1. Well woman exam with routine  gynecological exam Z01.419 V72.31 Liquid-based pap smear, screening      HPV High Risk Genotypes, PCR   2. Screen for STD (sexually transmitted disease) Z11.3 V74.5 HIV 1/2 Ag/Ab (4th Gen)      RPR      Hepatitis panel, acute      C. trachomatis/N. gonorrhoeae by AMP DNA   3. Vaginal odor N89.8 625.8 Vaginosis Screen by DNA Probe          Plan:      Nancie was seen today for well woman.    Diagnoses and all orders for this visit:    Well woman exam with routine gynecological exam  -     Liquid-based pap smear, screening  -     HPV High Risk Genotypes, PCR    Screen for STD (sexually transmitted disease)  -     HIV 1/2 Ag/Ab (4th Gen); Future  -     RPR; Future  -     Hepatitis panel, acute; Future  -     C. trachomatis/N. gonorrhoeae by AMP DNA    Vaginal odor  -     Vaginosis Screen by DNA Probe        Orders Placed This Encounter   Procedures    HPV High Risk Genotypes, PCR    C. trachomatis/N. gonorrhoeae by AMP DNA    Vaginosis Screen by DNA Probe    HIV 1/2 Ag/Ab (4th Gen)    RPR    Hepatitis panel, acute       Well Woman:  - Pap smear and hpv today  - Birth control: nexplanon  - GC/CT:done today  - Mammogram: due age 38  - Smoking cessation: n/a  - Labs: HIV, Syphilis (RPR), Hepatitis    - Vaccines: n/a  - Exercise counseling  - vaginosis screen    Follow up in one year for annual or prn.    Lolly Hartman

## 2019-05-15 ENCOUNTER — TELEPHONE (OUTPATIENT)
Dept: OBSTETRICS AND GYNECOLOGY | Facility: CLINIC | Age: 29
End: 2019-05-15

## 2019-05-15 DIAGNOSIS — B96.89 BV (BACTERIAL VAGINOSIS): Primary | ICD-10-CM

## 2019-05-15 DIAGNOSIS — N76.0 BV (BACTERIAL VAGINOSIS): Primary | ICD-10-CM

## 2019-05-15 LAB
HAV IGM SERPL QL IA: NEGATIVE
HBV CORE IGM SERPL QL IA: NEGATIVE
HBV SURFACE AG SERPL QL IA: NEGATIVE
HCV AB SERPL QL IA: NEGATIVE
HIV 1+2 AB+HIV1 P24 AG SERPL QL IA: NEGATIVE
RPR SER QL: NORMAL

## 2019-05-15 RX ORDER — METRONIDAZOLE 500 MG/1
500 TABLET ORAL EVERY 12 HOURS
Qty: 14 TABLET | Refills: 0 | Status: SHIPPED | OUTPATIENT
Start: 2019-05-15 | End: 2019-05-22

## 2019-05-15 NOTE — TELEPHONE ENCOUNTER
----- Message from Lolly Hartman MD sent at 5/14/2019  9:38 PM CDT -----  Notify patient; wait until remainder of labs return.

## 2019-05-16 ENCOUNTER — TELEPHONE (OUTPATIENT)
Dept: OBSTETRICS AND GYNECOLOGY | Facility: CLINIC | Age: 29
End: 2019-05-16

## 2019-05-16 NOTE — TELEPHONE ENCOUNTER
----- Message from Leonila Nur sent at 5/16/2019  1:00 PM CDT -----  Contact: Pt  Type:  Patient Returning Call    Who Called: LISA JASSO [[id number]]  Who Left Message for Patient: Sasha Escamilla MA     Does the patient know what this is regarding?:Test results     Best Call Back Number:[phone]    Additional Information: No

## 2019-05-16 NOTE — TELEPHONE ENCOUNTER
----- Message from Sita Mcclain sent at 5/16/2019  9:07 AM CDT -----  Contact: pt  Type:  Patient Returning Call    Who Called: Nancie    Who Left Message for Patient: Sasha     Does the patient know what this is regarding?:yes    Would the patient rather a call back or a response via [facility]? Call Back    Best Call Back Number:[phone]    Additional Information: no

## 2019-05-17 LAB
HPV HR 12 DNA CVX QL NAA+PROBE: NEGATIVE
HPV16 AG SPEC QL: NEGATIVE
HPV18 DNA SPEC QL NAA+PROBE: NEGATIVE

## 2019-06-03 ENCOUNTER — OFFICE VISIT (OUTPATIENT)
Dept: URGENT CARE | Facility: CLINIC | Age: 29
End: 2019-06-03
Payer: COMMERCIAL

## 2019-06-03 VITALS
TEMPERATURE: 98 F | RESPIRATION RATE: 18 BRPM | OXYGEN SATURATION: 100 % | SYSTOLIC BLOOD PRESSURE: 116 MMHG | BODY MASS INDEX: 20.36 KG/M2 | HEART RATE: 79 BPM | HEIGHT: 59 IN | WEIGHT: 101 LBS | DIASTOLIC BLOOD PRESSURE: 74 MMHG

## 2019-06-03 DIAGNOSIS — R30.0 DYSURIA: ICD-10-CM

## 2019-06-03 DIAGNOSIS — A49.9 UTI (URINARY TRACT INFECTION), BACTERIAL: Primary | ICD-10-CM

## 2019-06-03 DIAGNOSIS — N39.0 UTI (URINARY TRACT INFECTION), BACTERIAL: Primary | ICD-10-CM

## 2019-06-03 LAB
B-HCG UR QL: NEGATIVE
BILIRUB UR QL STRIP: NEGATIVE
CTP QC/QA: YES
GLUCOSE UR QL STRIP: NEGATIVE
KETONES UR QL STRIP: NEGATIVE
LEUKOCYTE ESTERASE UR QL STRIP: POSITIVE
PH, POC UA: 6.5 (ref 5–8)
POC BLOOD, URINE: POSITIVE
POC NITRATES, URINE: POSITIVE
PROT UR QL STRIP: NEGATIVE
SP GR UR STRIP: 1.01 (ref 1–1.03)
UROBILINOGEN UR STRIP-ACNC: NORMAL (ref 0.1–1.1)

## 2019-06-03 PROCEDURE — 87077 CULTURE AEROBIC IDENTIFY: CPT

## 2019-06-03 PROCEDURE — 3008F BODY MASS INDEX DOCD: CPT | Mod: CPTII,S$GLB,, | Performed by: NURSE PRACTITIONER

## 2019-06-03 PROCEDURE — 87186 SC STD MICRODIL/AGAR DIL: CPT

## 2019-06-03 PROCEDURE — 81003 URINALYSIS AUTO W/O SCOPE: CPT | Mod: QW,S$GLB,, | Performed by: NURSE PRACTITIONER

## 2019-06-03 PROCEDURE — 99000 PR SPECIMEN HANDLING,DR OFF->LAB: ICD-10-PCS | Mod: S$GLB,,, | Performed by: NURSE PRACTITIONER

## 2019-06-03 PROCEDURE — 99000 SPECIMEN HANDLING OFFICE-LAB: CPT | Mod: S$GLB,,, | Performed by: NURSE PRACTITIONER

## 2019-06-03 PROCEDURE — 81025 URINE PREGNANCY TEST: CPT | Mod: S$GLB,,, | Performed by: NURSE PRACTITIONER

## 2019-06-03 PROCEDURE — 81003 POCT URINALYSIS, DIPSTICK, AUTOMATED, W/O SCOPE: ICD-10-PCS | Mod: QW,S$GLB,, | Performed by: NURSE PRACTITIONER

## 2019-06-03 PROCEDURE — 87088 URINE BACTERIA CULTURE: CPT

## 2019-06-03 PROCEDURE — 87086 URINE CULTURE/COLONY COUNT: CPT

## 2019-06-03 PROCEDURE — 3008F PR BODY MASS INDEX (BMI) DOCUMENTED: ICD-10-PCS | Mod: CPTII,S$GLB,, | Performed by: NURSE PRACTITIONER

## 2019-06-03 PROCEDURE — 99214 PR OFFICE/OUTPT VISIT, EST, LEVL IV, 30-39 MIN: ICD-10-PCS | Mod: S$GLB,,, | Performed by: NURSE PRACTITIONER

## 2019-06-03 PROCEDURE — 81025 POCT URINE PREGNANCY: ICD-10-PCS | Mod: S$GLB,,, | Performed by: NURSE PRACTITIONER

## 2019-06-03 PROCEDURE — 99214 OFFICE O/P EST MOD 30 MIN: CPT | Mod: S$GLB,,, | Performed by: NURSE PRACTITIONER

## 2019-06-03 RX ORDER — PHENAZOPYRIDINE HYDROCHLORIDE 200 MG/1
200 TABLET, FILM COATED ORAL 3 TIMES DAILY PRN
Qty: 6 TABLET | Refills: 0 | Status: SHIPPED | OUTPATIENT
Start: 2019-06-03 | End: 2019-06-05

## 2019-06-03 RX ORDER — SULFAMETHOXAZOLE AND TRIMETHOPRIM 800; 160 MG/1; MG/1
1 TABLET ORAL 2 TIMES DAILY
Qty: 14 TABLET | Refills: 0 | Status: SHIPPED | OUTPATIENT
Start: 2019-06-03 | End: 2019-06-10

## 2019-06-03 NOTE — PROGRESS NOTES
"Subjective:       Patient ID: Nancie Ash is a 28 y.o. female.    Vitals:    06/03/19 1803   BP: 116/74   Pulse: 79   Resp: 18   Temp: 97.6 °F (36.4 °C)   SpO2: 100%   Weight: 45.8 kg (101 lb)   Height: 4' 11" (1.499 m)       Chief Complaint: Dysuria (3 days now )    Patient presents with dysuria, hematuria, urgency, frequency.  She started taking azo with some relief.  No fever chills.  Patient states that she does think that it is associated with the last time that she had sex as she did not urinate after.  She did use a condom and she is not concerned with an STD.  Her partner does not have any symptoms.  Denies any vaginal discharge.    Dysuria    This is a new problem. The current episode started in the past 7 days. The problem occurs every urination. The problem has been rapidly worsening. The quality of the pain is described as burning. The pain is at a severity of 5/10. The pain is mild. There has been no fever. She is sexually active. There is no history of pyelonephritis. Associated symptoms include frequency and urgency. Pertinent negatives include no behavior changes, chills, discharge, flank pain, hematuria, hesitancy, nausea, possible pregnancy, sweats, vomiting, weight loss, bubble bath use, constipation, rash or withholding. Treatments tried: azo  The treatment provided mild relief. There is no history of recurrent UTIs or STD.     Review of Systems   Constitution: Negative for chills, fever and weight loss.   Skin: Negative for itching and rash.   Musculoskeletal: Negative for back pain.   Gastrointestinal: Negative for abdominal pain, constipation, nausea and vomiting.   Genitourinary: Positive for dysuria, frequency and urgency. Negative for flank pain, genital sores, hematuria, hesitancy, missed menses and non-menstrual bleeding.       Objective:      Physical Exam   Constitutional: She is oriented to person, place, and time. She appears well-developed and well-nourished. No distress. "   HENT:   Head: Normocephalic and atraumatic.   Right Ear: External ear normal.   Left Ear: External ear normal.   Nose: Nose normal. No nasal deformity. No epistaxis.   Mouth/Throat: Oropharynx is clear and moist and mucous membranes are normal.   Eyes: Conjunctivae and lids are normal.   Neck: Trachea normal, normal range of motion and phonation normal. Neck supple.   Cardiovascular: Normal rate, regular rhythm, normal heart sounds and normal pulses.   Pulmonary/Chest: Effort normal and breath sounds normal.   Abdominal: Soft. Normal appearance and bowel sounds are normal. She exhibits no distension and no mass. There is no tenderness. There is no rigidity, no rebound, no guarding and no CVA tenderness.   Neurological: She is alert and oriented to person, place, and time.   Skin: Skin is warm, dry and intact. She is not diaphoretic.   Psychiatric: She has a normal mood and affect. Her speech is normal and behavior is normal. Cognition and memory are normal.   Nursing note and vitals reviewed.      Results for orders placed or performed in visit on 06/03/19   POCT urine pregnancy   Result Value Ref Range    POC Preg Test, Ur Negative Negative     Acceptable Yes    POCT Urinalysis, Dipstick, Automated, W/O Scope   Result Value Ref Range    POC Blood, Urine Positive (A) Negative    POC Bilirubin, Urine Negative Negative    POC Urobilinogen, Urine Normal 0.1 - 1.1    POC Ketones, Urine Negative Negative    POC Protein, Urine Negative Negative    POC Nitrates, Urine Positive (A) Negative    POC Glucose, Urine Negative Negative    pH, UA 6.5 5 - 8    POC Specific Gravity, Urine 1.015 1.003 - 1.029    POC Leukocytes, Urine Positive (A) Negative     Assessment:       1. UTI (urinary tract infection), bacterial    2. Dysuria        Plan:       Nancie was seen today for dysuria.    Diagnoses and all orders for this visit:    UTI (urinary tract infection), bacterial  -     Urine culture  -      sulfamethoxazole-trimethoprim 800-160mg (BACTRIM DS) 800-160 mg Tab; Take 1 tablet by mouth 2 (two) times daily. for 7 days    Dysuria  -     POCT urine pregnancy  -     POCT Urinalysis, Dipstick, Automated, W/O Scope  -     phenazopyridine (PYRIDIUM) 200 MG tablet; Take 1 tablet (200 mg total) by mouth 3 (three) times daily as needed for Pain.      Patient Instructions                                                                       UTI   If your condition worsens or fails to improve we recommend that you receive another evaluation at the ER immediately or contact your PCP to discuss your concerns or return here. You must understand that you've received an urgent care treatment only and that you may be released before all your medical problems are known or treated. You the patient will arrange for followup care as instructed.   If you had cultures done it will take 3-5 days to result. We will call you with the result.   If you are are female and on BCP use additional methods to prevent pregnancy while on the antibiotics and for one cycle after.   Cranberry juice may help. Get the 100% cranberry juice and mix 4 oz of juice with 4 oz of water and drink this 8 oz glass of liquid once a day.   Increase water intake to at least 8-10 glasses/day.  Do not wear contacts with Pyridium as it will stain them.  You may want to wear a panty liner with Pyridium as it may stain your underwear.  Avoid caffeine, alcohol, or spicy foods as they irritate the bladder.        Bladder Infection, Female (Adult)    Urine is normally doesn't have any bacteria in it. But bacteria can get into the urinary tract from the skin around the rectum. Or they can travel in the blood from elsewhere in the body. Once they are in your urinary tract, they can cause infection in the urethra (urethritis), the bladder (cystitis), or the kidneys (pyelonephritis).  The most common place for an infection is in the bladder. This is called a bladder  "infection. This is one of the most common infections in women. Most bladder infections are easily treated. They are not serious unless the infection spreads to the kidney.  The phrases "bladder infection," "UTI," and "cystitis" are often used to describe the same thing. But they are not always the same. Cystitis is an inflammation of the bladder. The most common cause of cystitis is an infection.  Symptoms  The infection causes inflammation in the urethra and bladder. This causes many of the symptoms. The most common symptoms of a bladder infection are:  · Pain or burning when urinating  · Having to urinate more often than usual  · Urgent need to urinate  · Only a small amount of urine comes out  · Blood in urine  · Abdominal discomfort. This is usually in the lower abdomen above the pubic bone.  · Cloudy urine  · Strong- or bad-smelling urine  · Unable to urinate (urinary retention)  · Unable to hold urine in (urinary incontinence)  · Fever  · Loss of appetite  · Confusion (in older adults)  Causes  Bladder infections are not contagious. You can't get one from someone else, from a toilet seat, or from sharing a bath.  The most common cause of bladder infections is bacteria from the bowels. The bacteria get onto the skin around the opening of the urethra. From there, they can get into the urine and travel up to the bladder, causing inflammation and infection. This usually happens because of:  · Wiping improperly after urinating. Always wipe from front to back.  · Bowel incontinence  · Pregnancy  · Procedures such as having a catheter inserted  · Older age  · Not emptying your bladder. This can allow bacteria a chance to grow in your urine.  · Dehydration  · Constipation  · Sex  · Use of a diaphragm for birth control   Treatment  Bladder infections are diagnosed by a urine test. They are treated with antibiotics and usually clear up quickly without complications. Treatment helps prevent a more serious kidney " infection.  Medicines  Medicines can help in the treatment of a bladder infection:  · Take antibiotics until they are used up, even if you feel better. It is important to finish them to make sure the infection has cleared.  · You can use acetaminophen or ibuprofen for pain, fever, or discomfort, unless another medicine was prescribed. If you have chronic liver or kidney disease, talk with your healthcare provider before using these medicines. Also talk with your provider if you've ever had a stomach ulcer or gastrointestinal bleeding, or are taking blood-thinner medicines.  · If you are given phenazopydridine to reduce burning with urination, it will cause your urine to become a bright orange color. This can stain clothing.  Care and prevention  These self-care steps can help prevent future infections:  · Drink plenty of fluids to prevent dehydration and flush out your bladder. Do this unless you must restrict fluids for other health reasons, or your doctor told you not to.  · Proper cleaning after going to the bathroom is important. Wipe from front to back after using the toilet to prevent the spread of bacteria.  · Urinate more often. Don't try to hold urine in for a long time.  · Wear loose-fitting clothes and cotton underwear. Avoid tight-fitting pants.  · Improve your diet and prevent constipation. Eat more fresh fruit and vegetables, and fiber, and less junk and fatty foods.  · Avoid sex until your symptoms are gone.  · Avoid caffeine, alcohol, and spicy foods. These can irritate your bladder.  · Urinate right after intercourse to flush out your bladder.  · If you use birth control pills and have frequent bladder infections, discuss it with your doctor.  Follow-up care  Call your healthcare provider if all symptoms are not gone after 3 days of treatment. This is especially important if you have repeat infections.  If a culture was done, you will be told if your treatment needs to be changed. If directed, you  can call to find out the results.  If X-rays were done, you will be told if the results will affect your treatment.  Call 911  Call 911 if any of the following occur:  · Trouble breathing  · Hard to wake up or confusion  · Fainting or loss of consciousness  · Rapid heart rate  When to seek medical advice  Call your healthcare provider right away if any of these occur:  · Fever of 100.4ºF (38.0ºC) or higher, or as directed by your healthcare provider  · Symptoms are not better by the third day of treatment  · Back or belly (abdominal) pain that gets worse  · Repeated vomiting, or unable to keep medicine down  · Weakness or dizziness  · Vaginal discharge  · Pain, redness, or swelling in the outer vaginal area (labia)  Date Last Reviewed: 10/1/2016  © 5056-1412 [employer]. [address] [place] [address], [place] [address]. All rights reserved. This information is not intended as a substitute for professional medical care. Always follow your healthcare professional's instructions.

## 2019-06-03 NOTE — PATIENT INSTRUCTIONS
"                                                                    UTI   If your condition worsens or fails to improve we recommend that you receive another evaluation at the ER immediately or contact your PCP to discuss your concerns or return here. You must understand that you've received an urgent care treatment only and that you may be released before all your medical problems are known or treated. You the patient will arrange for followup care as instructed.   If you had cultures done it will take 3-5 days to result. We will call you with the result.   If you are are female and on BCP use additional methods to prevent pregnancy while on the antibiotics and for one cycle after.   Cranberry juice may help. Get the 100% cranberry juice and mix 4 oz of juice with 4 oz of water and drink this 8 oz glass of liquid once a day.   Increase water intake to at least 8-10 glasses/day.  Do not wear contacts with Pyridium as it will stain them.  You may want to wear a panty liner with Pyridium as it may stain your underwear.  Avoid caffeine, alcohol, or spicy foods as they irritate the bladder.        Bladder Infection, Female (Adult)    Urine is normally doesn't have any bacteria in it. But bacteria can get into the urinary tract from the skin around the rectum. Or they can travel in the blood from elsewhere in the body. Once they are in your urinary tract, they can cause infection in the urethra (urethritis), the bladder (cystitis), or the kidneys (pyelonephritis).  The most common place for an infection is in the bladder. This is called a bladder infection. This is one of the most common infections in women. Most bladder infections are easily treated. They are not serious unless the infection spreads to the kidney.  The phrases "bladder infection," "UTI," and "cystitis" are often used to describe the same thing. But they are not always the same. Cystitis is an inflammation of the bladder. The most common cause of " cystitis is an infection.  Symptoms  The infection causes inflammation in the urethra and bladder. This causes many of the symptoms. The most common symptoms of a bladder infection are:  · Pain or burning when urinating  · Having to urinate more often than usual  · Urgent need to urinate  · Only a small amount of urine comes out  · Blood in urine  · Abdominal discomfort. This is usually in the lower abdomen above the pubic bone.  · Cloudy urine  · Strong- or bad-smelling urine  · Unable to urinate (urinary retention)  · Unable to hold urine in (urinary incontinence)  · Fever  · Loss of appetite  · Confusion (in older adults)  Causes  Bladder infections are not contagious. You can't get one from someone else, from a toilet seat, or from sharing a bath.  The most common cause of bladder infections is bacteria from the bowels. The bacteria get onto the skin around the opening of the urethra. From there, they can get into the urine and travel up to the bladder, causing inflammation and infection. This usually happens because of:  · Wiping improperly after urinating. Always wipe from front to back.  · Bowel incontinence  · Pregnancy  · Procedures such as having a catheter inserted  · Older age  · Not emptying your bladder. This can allow bacteria a chance to grow in your urine.  · Dehydration  · Constipation  · Sex  · Use of a diaphragm for birth control   Treatment  Bladder infections are diagnosed by a urine test. They are treated with antibiotics and usually clear up quickly without complications. Treatment helps prevent a more serious kidney infection.  Medicines  Medicines can help in the treatment of a bladder infection:  · Take antibiotics until they are used up, even if you feel better. It is important to finish them to make sure the infection has cleared.  · You can use acetaminophen or ibuprofen for pain, fever, or discomfort, unless another medicine was prescribed. If you have chronic liver or kidney disease,  talk with your healthcare provider before using these medicines. Also talk with your provider if you've ever had a stomach ulcer or gastrointestinal bleeding, or are taking blood-thinner medicines.  · If you are given phenazopydridine to reduce burning with urination, it will cause your urine to become a bright orange color. This can stain clothing.  Care and prevention  These self-care steps can help prevent future infections:  · Drink plenty of fluids to prevent dehydration and flush out your bladder. Do this unless you must restrict fluids for other health reasons, or your doctor told you not to.  · Proper cleaning after going to the bathroom is important. Wipe from front to back after using the toilet to prevent the spread of bacteria.  · Urinate more often. Don't try to hold urine in for a long time.  · Wear loose-fitting clothes and cotton underwear. Avoid tight-fitting pants.  · Improve your diet and prevent constipation. Eat more fresh fruit and vegetables, and fiber, and less junk and fatty foods.  · Avoid sex until your symptoms are gone.  · Avoid caffeine, alcohol, and spicy foods. These can irritate your bladder.  · Urinate right after intercourse to flush out your bladder.  · If you use birth control pills and have frequent bladder infections, discuss it with your doctor.  Follow-up care  Call your healthcare provider if all symptoms are not gone after 3 days of treatment. This is especially important if you have repeat infections.  If a culture was done, you will be told if your treatment needs to be changed. If directed, you can call to find out the results.  If X-rays were done, you will be told if the results will affect your treatment.  Call 911  Call 911 if any of the following occur:  · Trouble breathing  · Hard to wake up or confusion  · Fainting or loss of consciousness  · Rapid heart rate  When to seek medical advice  Call your healthcare provider right away if any of these occur:  · Fever of  100.4ºF (38.0ºC) or higher, or as directed by your healthcare provider  · Symptoms are not better by the third day of treatment  · Back or belly (abdominal) pain that gets worse  · Repeated vomiting, or unable to keep medicine down  · Weakness or dizziness  · Vaginal discharge  · Pain, redness, or swelling in the outer vaginal area (labia)  Date Last Reviewed: 10/1/2016  © 0437-7086 [employer]. [address] [place] [address], [place] [address]. All rights reserved. This information is not intended as a substitute for professional medical care. Always follow your healthcare professional's instructions.

## 2019-06-06 ENCOUNTER — TELEPHONE (OUTPATIENT)
Dept: URGENT CARE | Facility: CLINIC | Age: 29
End: 2019-06-06

## 2019-06-06 LAB — BACTERIA UR CULT: NORMAL

## 2019-06-07 ENCOUNTER — TELEPHONE (OUTPATIENT)
Dept: URGENT CARE | Facility: CLINIC | Age: 29
End: 2019-06-07

## 2019-06-07 RX ORDER — CIPROFLOXACIN 500 MG/1
500 TABLET ORAL EVERY 12 HOURS
Qty: 10 TABLET | Refills: 0 | Status: SHIPPED | OUTPATIENT
Start: 2019-06-07 | End: 2019-06-12

## 2019-06-07 NOTE — TELEPHONE ENCOUNTER
SPOKE TO MS JASSO.  E COLI POSITIVE URINE CULTURE RESISTANT TO BACTRIM DS WHICH SHE WAS PRESCRIBED. SHE FEELS IT HAS ONLY BEEN KEPT IN CHECK AND NOT FULLY RESOLVED. IT IS SENSITIVE TO MANY OTHERS INCLUDING MACROBID AND CIPRO. cALLED IN CIPRO TO HER PHARMACY. SHE DENIES FEVERS, CHILLS/

## 2019-06-24 ENCOUNTER — TELEPHONE (OUTPATIENT)
Dept: OBSTETRICS AND GYNECOLOGY | Facility: CLINIC | Age: 29
End: 2019-06-24

## 2019-06-24 NOTE — TELEPHONE ENCOUNTER
----- Message from Sita Mcclain sent at 6/24/2019 10:08 AM CDT -----  Contact: pt  Name of Who is Calling: vicenta      What is the request in detail: renee;ling to schedule to get her nexplanon removed, and get a new one inserted. Please call to advise      Can the clinic reply by [facility]: no      [facility]: [phone]

## 2019-06-24 NOTE — TELEPHONE ENCOUNTER
Returning patients call regarding Nexplanon removal/insertion. Patient scheduled appointment to discuss birth control options with Dr. Hartman.    Uziel

## 2019-06-27 ENCOUNTER — OFFICE VISIT (OUTPATIENT)
Dept: URGENT CARE | Facility: CLINIC | Age: 29
End: 2019-06-27
Payer: COMMERCIAL

## 2019-06-27 VITALS
DIASTOLIC BLOOD PRESSURE: 90 MMHG | TEMPERATURE: 98 F | BODY MASS INDEX: 21.17 KG/M2 | HEART RATE: 88 BPM | HEIGHT: 59 IN | RESPIRATION RATE: 18 BRPM | WEIGHT: 105 LBS | OXYGEN SATURATION: 100 % | SYSTOLIC BLOOD PRESSURE: 119 MMHG

## 2019-06-27 DIAGNOSIS — N76.4 ABSCESS OF LEFT GENITAL LABIA: Primary | ICD-10-CM

## 2019-06-27 PROCEDURE — 99214 PR OFFICE/OUTPT VISIT, EST, LEVL IV, 30-39 MIN: ICD-10-PCS | Mod: S$GLB,,, | Performed by: PHYSICIAN ASSISTANT

## 2019-06-27 PROCEDURE — 3008F BODY MASS INDEX DOCD: CPT | Mod: CPTII,S$GLB,, | Performed by: PHYSICIAN ASSISTANT

## 2019-06-27 PROCEDURE — 3008F PR BODY MASS INDEX (BMI) DOCUMENTED: ICD-10-PCS | Mod: CPTII,S$GLB,, | Performed by: PHYSICIAN ASSISTANT

## 2019-06-27 PROCEDURE — 99214 OFFICE O/P EST MOD 30 MIN: CPT | Mod: S$GLB,,, | Performed by: PHYSICIAN ASSISTANT

## 2019-06-27 RX ORDER — MUPIROCIN 20 MG/G
OINTMENT TOPICAL
Qty: 22 G | Refills: 1 | Status: SHIPPED | OUTPATIENT
Start: 2019-06-27

## 2019-06-27 RX ORDER — SULFAMETHOXAZOLE AND TRIMETHOPRIM 800; 160 MG/1; MG/1
1 TABLET ORAL 2 TIMES DAILY
Qty: 10 TABLET | Refills: 0 | Status: SHIPPED | OUTPATIENT
Start: 2019-06-27 | End: 2019-07-02

## 2019-06-27 RX ORDER — MUPIROCIN 20 MG/G
OINTMENT TOPICAL
Status: DISCONTINUED | OUTPATIENT
Start: 2019-06-27 | End: 2019-06-27

## 2019-06-27 NOTE — PROGRESS NOTES
"Subjective:       Patient ID: Nancie Ash is a 28 y.o. female.    Vitals:  height is 4' 11" (1.499 m) and weight is 47.6 kg (105 lb). Her temperature is 98.3 °F (36.8 °C). Her blood pressure is 119/90 (abnormal) and her pulse is 88. Her respiration is 18 and oxygen saturation is 100%.     Chief Complaint: Vaginal Pain    Pt states she thinks she may have a vaginal cyst that has been going on for a few days. She reports pain and tenderness to left labia. She denies drainage, fever/chills, new sexual partners.   Vaginal Pain   The patient's primary symptoms include genital lesions. The patient's pertinent negatives include no genital itching, genital rash, pelvic pain, vaginal bleeding or vaginal discharge. This is a new problem. The current episode started in the past 7 days. The problem occurs constantly. The problem has been unchanged. The pain is moderate. The problem affects the left side. Pertinent negatives include no abdominal pain, chills, dysuria, fever, flank pain, nausea or vomiting. Exacerbated by: touch/contact. She has tried nothing for the symptoms. No, her partner does not have an STD.       Constitution: Negative for chills and fever.   Neck: Negative for painful lymph nodes.   Gastrointestinal: Negative for abdominal pain, nausea and vomiting.   Genitourinary: Positive for vaginal pain. Negative for dysuria, urine decreased, flank pain, history of kidney stones, painful menstruation, irregular menstruation, heavy menstrual bleeding, genital trauma, vaginal discharge, vaginal bleeding, vaginal odor, painful intercourse, genital sore, painful ejaculation and pelvic pain.   Skin: Negative for lesion.   Hematologic/Lymphatic: Negative for swollen lymph nodes.       Objective:      Physical Exam   Constitutional: She is oriented to person, place, and time. She appears well-developed and well-nourished.   HENT:   Head: Normocephalic and atraumatic.   Right Ear: External ear normal.   Left Ear: " External ear normal.   Nose: Nose normal. No nasal deformity. No epistaxis.   Mouth/Throat: Oropharynx is clear and moist and mucous membranes are normal.   Eyes: Conjunctivae and lids are normal.   Neck: Trachea normal, normal range of motion and phonation normal. Neck supple.   Pulmonary/Chest: Effort normal. No respiratory distress.   Genitourinary:         Neurological: She is alert and oriented to person, place, and time.   Skin: Skin is warm, dry and intact.   Psychiatric: She has a normal mood and affect. Her speech is normal and behavior is normal. Cognition and memory are normal.   Nursing note and vitals reviewed.      Assessment:       1. Abscess of left genital labia        Plan:         Abscess of left genital labia  -     Discontinue: mupirocin 2 % ointment  -     sulfamethoxazole-trimethoprim 800-160mg (BACTRIM DS) 800-160 mg Tab; Take 1 tablet by mouth 2 (two) times daily. for 5 days  Dispense: 10 tablet; Refill: 0  -     mupirocin (BACTROBAN) 2 % ointment; Apply to affected area 3 times daily  Dispense: 22 g; Refill: 1      Abscess (Antibiotic Treatment Only)  An abscess (sometimes called a boil) happens when bacteria get trapped under the skin and start to grow. Pus forms inside the abscess as the body responds to the bacteria. An abscess can happen with an insect bite, ingrown hair, blocked oil gland, pimple, cyst, or puncture wound.  In the early stages, your wound may be red and tender. For this stage, you may get antibiotics. If the abscess does not get better with antibiotics, it will need to be drained with a small cut.  Home care  These tips will help you care for your abscess at home:  · Soak the wound in hot water or apply hot packs (small towel soaked in hot water) to the area for 20 minutes at a time. Do this 3 to 4 times a day.  · Do not cut, squeeze, or pop the boil yourself.  · Apply antibiotic cream or ointment to the skin 3 to 4 times a day, unless something else was prescribed.  Some ointments include an antibiotic plus a pain reliever.  · If your doctor prescribed antibiotics, do not stop taking them until you have finished the medicine or the doctor tells you to stop.  · You may use an over-the-counter pain medicine to control pain, unless another pain medicine was prescribed. If you have chronic liver or kidney disease or ever had a stomach ulcer or gastrointestinal bleeding, talk with your doctor before using these any of these.  Follow-up care  Follow up with your healthcare provider, or as advised. Check your wound each day for the signs of worsening infection listed below.  When to seek medical advice  Get prompt medical attention if any of these occur:  · An increase in redness or swelling  · Red streaks in the skin leading away from the abscess  · An increase in local pain or swelling  · Fever of 100.4ºF (38ºC) or higher, or as directed by your healthcare provider  · Pus or fluid coming from the abscess  · Boil returns after getting better  Date Last Reviewed: 9/1/2016  © 4003-9341 [employer]. [address] [place] [address]. All rights reserved. This information is not intended as a substitute for professional medical care. Always follow your healthcare professional's instructions.      Please follow up with your Primary care provider within 2-5 days if your signs and symptoms have not resolved or worsen.     If your condition worsens or fails to improve we recommend that you receive another evaluation at the emergency room immediately or contact your primary medical clinic to discuss your concerns.   You must understand that you have received an Urgent Care treatment only and that you may be released before all of your medical problems are known or treated. You, the patient, will arrange for follow up care as instructed.     RED FLAGS/WARNING SYMPTOMS DISCUSSED WITH PATIENT THAT WOULD WARRANT EMERGENT MEDICAL ATTENTION. PATIENT VERBALIZED UNDERSTANDING.

## 2019-06-27 NOTE — PATIENT INSTRUCTIONS
Abscess (Antibiotic Treatment Only)  An abscess (sometimes called a boil) happens when bacteria get trapped under the skin and start to grow. Pus forms inside the abscess as the body responds to the bacteria. An abscess can happen with an insect bite, ingrown hair, blocked oil gland, pimple, cyst, or puncture wound.  In the early stages, your wound may be red and tender. For this stage, you may get antibiotics. If the abscess does not get better with antibiotics, it will need to be drained with a small cut.  Home care  These tips will help you care for your abscess at home:  · Soak the wound in hot water or apply hot packs (small towel soaked in hot water) to the area for 20 minutes at a time. Do this 3 to 4 times a day.  · Do not cut, squeeze, or pop the boil yourself.  · Apply antibiotic cream or ointment to the skin 3 to 4 times a day, unless something else was prescribed. Some ointments include an antibiotic plus a pain reliever.  · If your doctor prescribed antibiotics, do not stop taking them until you have finished the medicine or the doctor tells you to stop.  · You may use an over-the-counter pain medicine to control pain, unless another pain medicine was prescribed. If you have chronic liver or kidney disease or ever had a stomach ulcer or gastrointestinal bleeding, talk with your doctor before using these any of these.  Follow-up care  Follow up with your healthcare provider, or as advised. Check your wound each day for the signs of worsening infection listed below.  When to seek medical advice  Get prompt medical attention if any of these occur:  · An increase in redness or swelling  · Red streaks in the skin leading away from the abscess  · An increase in local pain or swelling  · Fever of 100.4ºF (38ºC) or higher, or as directed by your healthcare provider  · Pus or fluid coming from the abscess  · Boil returns after getting better  Date Last Reviewed: 9/1/2016  © 3060-8635 [employer] [address] [place] [address]. All rights reserved. This information is not intended as a substitute for professional medical care. Always follow your healthcare professional's instructions.      Please follow up with your Primary care provider within 2-5 days if your signs and symptoms have not resolved or worsen.     If your condition worsens or fails to improve we recommend that you receive another evaluation at the emergency room immediately or contact your primary medical clinic to discuss your concerns.   You must understand that you have received an Urgent Care treatment only and that you may be released before all of your medical problems are known or treated. You, the patient, will arrange for follow up care as instructed.     RED FLAGS/WARNING SYMPTOMS DISCUSSED WITH PATIENT THAT WOULD WARRANT EMERGENT MEDICAL ATTENTION. PATIENT VERBALIZED UNDERSTANDING.

## 2019-07-02 ENCOUNTER — OFFICE VISIT (OUTPATIENT)
Dept: OBSTETRICS AND GYNECOLOGY | Facility: CLINIC | Age: 29
End: 2019-07-02
Payer: COMMERCIAL

## 2019-07-02 VITALS
SYSTOLIC BLOOD PRESSURE: 112 MMHG | DIASTOLIC BLOOD PRESSURE: 62 MMHG | BODY MASS INDEX: 20.08 KG/M2 | HEIGHT: 59 IN | WEIGHT: 99.63 LBS

## 2019-07-02 DIAGNOSIS — N76.4 VULVAR ABSCESS: Primary | ICD-10-CM

## 2019-07-02 PROCEDURE — 99213 PR OFFICE/OUTPT VISIT, EST, LEVL III, 20-29 MIN: ICD-10-PCS | Mod: S$GLB,,, | Performed by: OBSTETRICS & GYNECOLOGY

## 2019-07-02 PROCEDURE — 3008F BODY MASS INDEX DOCD: CPT | Mod: CPTII,S$GLB,, | Performed by: OBSTETRICS & GYNECOLOGY

## 2019-07-02 PROCEDURE — 3008F PR BODY MASS INDEX (BMI) DOCUMENTED: ICD-10-PCS | Mod: CPTII,S$GLB,, | Performed by: OBSTETRICS & GYNECOLOGY

## 2019-07-02 PROCEDURE — 99213 OFFICE O/P EST LOW 20 MIN: CPT | Mod: S$GLB,,, | Performed by: OBSTETRICS & GYNECOLOGY

## 2019-07-02 PROCEDURE — 99999 PR PBB SHADOW E&M-EST. PATIENT-LVL III: ICD-10-PCS | Mod: PBBFAC,,, | Performed by: OBSTETRICS & GYNECOLOGY

## 2019-07-02 PROCEDURE — 99999 PR PBB SHADOW E&M-EST. PATIENT-LVL III: CPT | Mod: PBBFAC,,, | Performed by: OBSTETRICS & GYNECOLOGY

## 2019-07-02 NOTE — PROGRESS NOTES
Gynecology    SUBJECTIVE:     Chief Complaint: vaginal bump       History of Present Illness:  28 year old who is here for follow up of vaginal cyst.  Reports that she had 4-5 days of a vaginal cyst that eventually burst and is now resolving.  She went to urgent care and was given bactrim and mupirocin ointment.  No fever or chills.  Currently, she reports that there is still some slight discomfort, but no cyst.    Review of Systems:  Review of Systems   Constitutional: Negative for chills and fever.   Genitourinary: Positive for genital sores and vaginal pain. Negative for menorrhagia, menstrual problem, pelvic pain, vaginal bleeding, vaginal discharge, vaginal dryness and vaginal odor.        OBJECTIVE:     Physical Exam:  Physical Exam   Constitutional: She is oriented to person, place, and time. She appears well-developed and well-nourished.   Pulmonary/Chest: Effort normal.   Genitourinary: No labial fusion. There is no rash, tenderness, lesion or injury on the right labia. There is no rash, tenderness, lesion or injury on the left labia.   Neurological: She is oriented to person, place, and time.   Skin: No pallor.   Psychiatric: She has a normal mood and affect. Her behavior is normal. Judgment and thought content normal.   Nursing note and vitals reviewed.      Chaperoned by: Leonila    ASSESSMENT:       ICD-10-CM ICD-9-CM    1. Vulvar abscess N76.4 616.4           Plan:      Nancie was seen today for vaginal bump.    Diagnoses and all orders for this visit:    Vulvar abscess    - resolved, normal exam; no residual cyst    No orders of the defined types were placed in this encounter.      Follow up for annual or prn.    Lolly Hartman

## 2021-07-01 ENCOUNTER — PATIENT MESSAGE (OUTPATIENT)
Dept: ADMINISTRATIVE | Facility: OTHER | Age: 31
End: 2021-07-01

## 2023-04-25 NOTE — TELEPHONE ENCOUNTER
Called pt and gave her test results per Dr. Hartman. Pt verbalized understanding.   NOTE IN PROGRESS/yes yes